# Patient Record
Sex: MALE | Race: WHITE | Employment: PART TIME | URBAN - METROPOLITAN AREA
[De-identification: names, ages, dates, MRNs, and addresses within clinical notes are randomized per-mention and may not be internally consistent; named-entity substitution may affect disease eponyms.]

---

## 2018-01-11 ENCOUNTER — HOSPITAL ENCOUNTER (EMERGENCY)
Facility: HOSPITAL | Age: 28
Discharge: HOME | End: 2018-01-11
Attending: PEDIATRICS
Payer: MEDICAID

## 2018-01-11 VITALS
HEIGHT: 71 IN | BODY MASS INDEX: 42 KG/M2 | HEART RATE: 97 BPM | DIASTOLIC BLOOD PRESSURE: 92 MMHG | SYSTOLIC BLOOD PRESSURE: 134 MMHG | RESPIRATION RATE: 18 BRPM | OXYGEN SATURATION: 95 % | TEMPERATURE: 98 F | WEIGHT: 300 LBS

## 2018-01-11 DIAGNOSIS — M54.40 BACK PAIN OF LUMBAR REGION WITH SCIATICA: Primary | ICD-10-CM

## 2018-01-11 PROCEDURE — 99283 EMERGENCY DEPT VISIT LOW MDM: CPT

## 2018-01-11 RX ORDER — IBUPROFEN 800 MG/1
800 TABLET ORAL EVERY 8 HOURS PRN
Qty: 30 TABLET | Refills: 0 | Status: SHIPPED | OUTPATIENT
Start: 2018-01-11 | End: 2018-01-18

## 2018-01-11 RX ORDER — PREDNISONE 20 MG/1
40 TABLET ORAL DAILY
Qty: 10 TABLET | Refills: 0 | Status: SHIPPED | OUTPATIENT
Start: 2018-01-11 | End: 2018-01-16

## 2018-01-11 NOTE — ED INITIAL ASSESSMENT (HPI)
Patient reports he was leaning forward to pick something up on Monday, reports felt \"flicker\" in lower back, worsening since then. Arrives ambulatory with wheelchair assistance. Reports numbness to groin area and right thigh.  Reports sharper pain to righ

## 2018-01-11 NOTE — ED PROVIDER NOTES
Patient Seen in: [facility] Emergency Department    History   Patient presents with:  Back Pain (musculoskeletal)    Stated Complaint: back pain    HPI    Patient is a 28-year-old male here with sciatica and low back pain.   He was bending forward to to sit or bend at the waist without significant pain. No obvious deformity.   Reflexes symmetric bilaterally lower extremities       ED Course   Labs Reviewed - No data to display    ED Course as of Jan 11 1333  --------------------------------------------

## (undated) NOTE — ED AVS SNAPSHOT
Isabella Marylinozzie   MRN: [id number]    Department:  [facility] Emergency Department   Date of Visit:  1/11/2018           Disclosure     Insurance plans vary and the physician(s) referred by the ER may not be covered by your plan.  Please tell this physician (or your personal doctor if your instructions are to return to your personal doctor) about any new or lasting problems. The primary care or specialist physician will see patients referred from the [facility] Emergency Department.  Eunice Weber

## (undated) NOTE — LETTER
January 11, 2018    Patient: Mia Damon   Date of Visit: 1/11/2018       To Whom It May Concern:    Mia Damon was seen and treated in our emergency department on 1/11/2018. He should not return to work until cleared by pmd or orthopedics.     If yo